# Patient Record
(demographics unavailable — no encounter records)

---

## 2025-01-09 NOTE — HEALTH RISK ASSESSMENT
[Fair] : ~his/her~ current health as fair  [Good] : ~his/her~  mood as  good [Intercurrent Urgi Care visits] : went to urgent care [Yes] : Yes [Monthly or less (1 pt)] : Monthly or less (1 point) [1 or 2 (0 pts)] : 1 or 2 (0 points) [Never (0 pts)] : Never (0 points) [No] : In the past 12 months have you used drugs other than those required for medical reasons? No [No falls in past year] : Patient reported no falls in the past year [0] : 2) Feeling down, depressed, or hopeless: Not at all (0) [Patient reported mammogram was normal] : Patient reported mammogram was normal [Patient reported PAP Smear was normal] : Patient reported PAP Smear was normal [HIV test declined] : HIV test declined [Hepatitis C test declined] : Hepatitis C test declined [# of Members in Household ___] :  household currently consist of [unfilled] member(s) [Retired] : retired [] :  [Smoke Detector] : smoke detector [Carbon Monoxide Detector] : carbon monoxide detector [Seat Belt] :  uses seat belt [Sunscreen] : uses sunscreen [de-identified] : [place] MD to check right ear  [de-identified] : cardiologist Dr. Bashir  [Audit-CScore] : 1 [de-identified] : 30 Minutes of aerobic exercise Five times  aweek  [de-identified] : Healthy [Reports changes in hearing] : Reports no changes in hearing [Reports changes in vision] : Reports no changes in vision [Reports changes in dental health] : Reports no changes in dental health [MammogramComments] : Patient doesn't recall last mammogram  [PapSmearComments] : Patient doesn't recall exact date  [BoneDensityComments] : Patient never done it before [ColonoscopyComments] : Patient never done it before [de-identified] :   [de-identified] : No

## 2025-01-09 NOTE — PHYSICAL EXAM
[No Acute Distress] : no acute distress [No JVD] : no jugular venous distention [No Respiratory Distress] : no respiratory distress  [Normal Rate] : normal rate  [No Edema] : there was no peripheral edema [Soft] : abdomen soft [Non Tender] : non-tender [Normal Supraclavicular Nodes] : no supraclavicular lymphadenopathy [Declined Breast Exam] : declined breast exam  [No CVA Tenderness] : no CVA  tenderness [No Joint Swelling] : no joint swelling [No Rash] : no rash [Normal Gait] : normal gait

## 2025-01-09 NOTE — REVIEW OF SYSTEMS
[Fever] : no fever [Earache] : no earache [Chest Pain] : no chest pain [Shortness Of Breath] : no shortness of breath [Abdominal Pain] : no abdominal pain [Dysuria] : no dysuria [Headache] : no headache [Insomnia] : no insomnia

## 2025-01-09 NOTE — PLAN
[FreeTextEntry1] : needs colonoscopy need for routine screening was discussed including mammogram GI referral given

## 2025-01-09 NOTE — REVIEW OF SYSTEMS
[Earache] : no earache [Fever] : no fever [Chest Pain] : no chest pain [Shortness Of Breath] : no shortness of breath [Abdominal Pain] : no abdominal pain [Dysuria] : no dysuria [Headache] : no headache [Insomnia] : no insomnia

## 2025-01-09 NOTE — HEALTH RISK ASSESSMENT
[Fair] : ~his/her~ current health as fair  [Good] : ~his/her~  mood as  good [Intercurrent Urgi Care visits] : went to urgent care [Yes] : Yes [Monthly or less (1 pt)] : Monthly or less (1 point) [1 or 2 (0 pts)] : 1 or 2 (0 points) [Never (0 pts)] : Never (0 points) [No] : In the past 12 months have you used drugs other than those required for medical reasons? No [No falls in past year] : Patient reported no falls in the past year [0] : 2) Feeling down, depressed, or hopeless: Not at all (0) [Patient reported mammogram was normal] : Patient reported mammogram was normal [Patient reported PAP Smear was normal] : Patient reported PAP Smear was normal [HIV test declined] : HIV test declined [Hepatitis C test declined] : Hepatitis C test declined [# of Members in Household ___] :  household currently consist of [unfilled] member(s) [Retired] : retired [] :  [Smoke Detector] : smoke detector [Carbon Monoxide Detector] : carbon monoxide detector [Seat Belt] :  uses seat belt [Sunscreen] : uses sunscreen [de-identified] : [place] MD to check right ear  [de-identified] : cardiologist Dr. Bashir  [Audit-CScore] : 1 [de-identified] : 30 Minutes of aerobic exercise Five times  aweek  [de-identified] : Healthy [Reports changes in hearing] : Reports no changes in hearing [Reports changes in vision] : Reports no changes in vision [Reports changes in dental health] : Reports no changes in dental health [PapSmearComments] : Patient doesn't recall exact date  [MammogramComments] : Patient doesn't recall last mammogram  [BoneDensityComments] : Patient never done it before [ColonoscopyComments] : Patient never done it before [de-identified] :   [de-identified] : No

## 2025-01-09 NOTE — HISTORY OF PRESENT ILLNESS
[FreeTextEntry1] : took abx  for ear infection 3 weeks ago given at Urgent Care  2 days after starting abx developed diarrhea with occasional blood streaking [de-identified] :  subsequently saw ENT  who told patient to stop abx because there was no ear infection currently having 3 loose BM /day no gross blood in bowl but does see small amount on paper no abd pain feels otherwise well never had colonoscopy no meds other than metoprolol

## 2025-01-09 NOTE — ASSESSMENT
[FreeTextEntry1] : no current distress never had colonoscopy  had labs done by cardiology [Dr Bashir] and cbc was normal- no leukocytosis and normal Hb

## 2025-01-09 NOTE — HISTORY OF PRESENT ILLNESS
[FreeTextEntry1] : took abx  for ear infection 3 weeks ago given at Urgent Care  2 days after starting abx developed diarrhea with occasional blood streaking [de-identified] :  subsequently saw ENT  who told patient to stop abx because there was no ear infection currently having 3 loose BM /day no gross blood in bowl but does see small amount on paper no abd pain feels otherwise well never had colonoscopy no meds other than metoprolol

## 2025-01-10 NOTE — HISTORY OF PRESENT ILLNESS
[FreeTextEntry1] : pt with PAF , PPM FOR PAUSES, MOD MR/TR, TR parvin: 2.9 m/s, mild DUSTIN , mod MR, HYPERKALEMIA , pulmonary htn, DD2  CUFF ACCURATE WITH OFFICE.   : stress nuclear: negative for ischemia, DTS 9  ep: DR. JOHNSON   23: pt is exercising 4 to 5 times/week 30 minutes of low impact aerobics, denies cp or sob, pt off of aspirin, last episode of afib was in .  had PPM checked using pacer 60% time per patient and no episodes of afib witinessed.  pt runs after grandkids  23: K: 5.6, SPS X 5 DAYS, REPEAT BW   23: 23:EF: 69%, DD1, mild LAE, mild DOM, mod MR, TR parvin: 2.8, IVC: 2.7  ekg: nsr normal t waves  pt did not take kalexate, pt changed diet and did not repeat bloodwork, f/u ppm and EOL in 1 year. pt says did not have afib on PPM or NSVT.  pt denies cp or sob.   24: hyperkalemic in past. and still 5.4 now. gfr: 90nt bnp: 318  HDL 54  ; HDL 67 LDL 84 GFR: 70 BNP: 42 TSH: 2.08  24: HDL: 54, T, LDL: 107, K: 5.4, BNP: 318, Pt says not drinking water and is doing low impact aerobics and not out of breath.  bnp elevated but elevated bnp.   24: 7/3/24: BNP: 172, HDL: 63, T, LDL: 91 potassium 4.2  24: 50-55%, DD2, TR parvin: 2.6 m/s, e' sept: 0.08 m/s, E/e': 8, L wave on mv inflow but less than 0.3 m/s, lvot vti: 17.8 cm. GLS: -17.3% posterolateral decreased strain. mild LAE, mod MR, PASP is 41 mmHg, consistent with pulmonary hypertension. dilated IVC 2.2 cm no Respirophasic changes.  PT HAD PPM BATTERY CHANGE IN JANUARY. PT  denies l ext edema, pt denies cp or sob. no afib on PPM. pt denies cp or sob and does 30 minutes daily of low impact aerobics.   1/10/25: 1/3/25: HDL: 58, T, LDL: 97, BNP: 135 24: 179BPM NSVT on EP report: Get Echo and CCTA.  12/10/24: EF: 64%, DD2, TR parvin: 3 m/s, GLS: -21.0%, E/a: 1.13, E/e': 8.4, lvot vti: 23.5 cm. mild LAE, mild MR, PASP: 52, mild AR TR parvin increased to 3 m/s.  pt had GI issues with diarrhea for a few weeks and cancelled CCTA. pt has rescheduled CCTA to 25  pt says she feels well.pt deneis cp, pt is able to do low impact aerobics.

## 2025-01-10 NOTE — HISTORY OF PRESENT ILLNESS
[FreeTextEntry1] : pt with PAF , PPM FOR PAUSES, MOD MR/TR, TR parvin: 2.9 m/s, mild DUSTIN , mod MR, HYPERKALEMIA , pulmonary htn, DD2  CUFF ACCURATE WITH OFFICE.   : stress nuclear: negative for ischemia, DTS 9  ep: DR. JOHNSON   23: pt is exercising 4 to 5 times/week 30 minutes of low impact aerobics, denies cp or sob, pt off of aspirin, last episode of afib was in .  had PPM checked using pacer 60% time per patient and no episodes of afib witinessed.  pt runs after grandkids  23: K: 5.6, SPS X 5 DAYS, REPEAT BW   23: 23:EF: 69%, DD1, mild LAE, mild DOM, mod MR, TR parvin: 2.8, IVC: 2.7  ekg: nsr normal t waves  pt did not take kalexate, pt changed diet and did not repeat bloodwork, f/u ppm and EOL in 1 year. pt says did not have afib on PPM or NSVT.  pt denies cp or sob.   24: hyperkalemic in past. and still 5.4 now. gfr: 90nt bnp: 318  HDL 54  ; HDL 67 LDL 84 GFR: 70 BNP: 42 TSH: 2.08  24: HDL: 54, T, LDL: 107, K: 5.4, BNP: 318, Pt says not drinking water and is doing low impact aerobics and not out of breath.  bnp elevated but elevated bnp.   24: 7/3/24: BNP: 172, HDL: 63, T, LDL: 91 potassium 4.2  24: 50-55%, DD2, TR parvni: 2.6 m/s, e' sept: 0.08 m/s, E/e': 8, L wave on mv inflow but less than 0.3 m/s, lvot vti: 17.8 cm. GLS: -17.3% posterolateral decreased strain. mild LAE, mod MR, PASP is 41 mmHg, consistent with pulmonary hypertension. dilated IVC 2.2 cm no Respirophasic changes.  PT HAD PPM BATTERY CHANGE IN JANUARY. PT  denies l ext edema, pt denies cp or sob. no afib on PPM. pt denies cp or sob and does 30 minutes daily of low impact aerobics.   1/10/25: 1/3/25: HDL: 58, T, LDL: 97, BNP: 135 24: 179BPM NSVT on EP report: Get Echo and CCTA.  12/10/24: EF: 64%, DD2, TR parvin: 3 m/s, GLS: -21.0%, E/a: 1.13, E/e': 8.4, lvot vti: 23.5 cm. mild LAE, mild MR, PASP: 52, mild AR TR parvin increased to 3 m/s.  pt had GI issues with diarrhea for a few weeks and cancelled CCTA. pt has rescheduled CCTA to 25  pt says she feels well.pt deneis cp, pt is able to do low impact aerobics.

## 2025-01-10 NOTE — PHYSICAL EXAM
[Normal Venous Pressure] : normal venous pressure [Normal S1, S2] : normal S1, S2 [Clear Lung Fields] : clear lung fields [Soft] : abdomen soft [No Edema] : no edema [de-identified] : jerry  [de-identified] : rrr

## 2025-01-10 NOTE — DISCUSSION/SUMMARY
[FreeTextEntry1] : compensated hfpef  DUSTIN no afib seen per patient on ep report watch high potassium foods,  increase hydration  pulmonary htn increasing, pt with DD2  will start jardiance/farxiga.  NSVT: await CCTA  continue to exercise  f/u ppm with dr wang.  f/u in 6 months bloodwork 2d echo

## 2025-01-10 NOTE — PHYSICAL EXAM
[Normal Venous Pressure] : normal venous pressure [Normal S1, S2] : normal S1, S2 [Clear Lung Fields] : clear lung fields [Soft] : abdomen soft [No Edema] : no edema [de-identified] : jerry  [de-identified] : rrr

## 2025-07-11 NOTE — PHYSICAL EXAM
[Normal Venous Pressure] : normal venous pressure [Normal S1, S2] : normal S1, S2 [Clear Lung Fields] : clear lung fields [Soft] : abdomen soft [No Edema] : no edema [de-identified] : jerry  [de-identified] : rrr

## 2025-07-11 NOTE — HISTORY OF PRESENT ILLNESS
[FreeTextEntry1] : pt with : CCTA: pLAD 50%, CAC 93 PAF , PPM FOR PAUSES, MOD MR/TR, TR parvin: 2.9 m/s, mild DUSTIN , mod MR, HYPERKALEMIA , pulmonary htn, DD2  CUFF ACCURATE WITH OFFICE.   : stress nuclear: negative for ischemia, DTS 9  ep: DR. JOHNSON   23: pt is exercising 4 to 5 times/week 30 minutes of low impact aerobics, denies cp or sob, pt off of aspirin, last episode of afib was in .  had PPM checked using pacer 60% time per patient and no episodes of afib witinessed.  pt runs after grandkids  23: K: 5.6, SPS X 5 DAYS, REPEAT BW   23: 23:EF: 69%, DD1, mild LAE, mild DOM, mod MR, TR parvin: 2.8, IVC: 2.7  ekg: nsr normal t waves  pt did not take kalexate, pt changed diet and did not repeat bloodwork, f/u ppm and EOL in 1 year. pt says did not have afib on PPM or NSVT.  pt denies cp or sob.   24: hyperkalemic in past. and still 5.4 now. gfr: 90nt bnp: 318  HDL 54  ; HDL 67 LDL 84 GFR: 70 BNP: 42 TSH: 2.08  24: HDL: 54, T, LDL: 107, K: 5.4, BNP: 318, Pt says not drinking water and is doing low impact aerobics and not out of breath.  bnp elevated but elevated bnp.   24: 7/3/24: BNP: 172, HDL: 63, T, LDL: 91 potassium 4.2  24: 50-55%, DD2, TR parvin: 2.6 m/s, e' sept: 0.08 m/s, E/e': 8, L wave on mv inflow but less than 0.3 m/s, lvot vti: 17.8 cm. GLS: -17.3% posterolateral decreased strain. mild LAE, mod MR, PASP is 41 mmHg, consistent with pulmonary hypertension. dilated IVC 2.2 cm no Respirophasic changes.  PT HAD PPM BATTERY CHANGE IN JANUARY. PT  denies l ext edema, pt denies cp or sob. no afib on PPM. pt denies cp or sob and does 30 minutes daily of low impact aerobics.   1/10/25: 1/3/25: HDL: 58, T, LDL: 97, BNP: 135 24: 179BPM NSVT on EP report: Get Echo and CCTA.  12/10/24: EF: 64%, DD2, TR parvin: 3 m/s, GLS: -21.0%, E/a: 1.13, E/e': 8.4, lvot vti: 23.5 cm. mild LAE, mild MR, PASP: 52, mild AR TR parvin increased to 3 m/s.  pt had GI issues with diarrhea for a few weeks and cancelled CCTA. pt has rescheduled CCTA to 25  pt says she feels well.pt denies cp, pt is able to do low impact aerobics.   25: Proximal LAD calcified plaque causing up to mild stenosis.  CAD-RADS 2. The total Agatston coronary artery calcium score equals 93. Patient given results by NP on 25. Rosuvastatin 20mg started.  25: No hemodynamically significant stenosis by FFR CT analysis. CAD-RADS 2/I-.  25: 7/3/25: HDL: 47, T, LDL: 28 from 97, BNP: 399 increasing  25: EF: 60%, LVMI: 81 g/m2, ivsd: 1.1 cm, GLS: -19.8%, E parvin: 0.55 m/s, E/a:: 1.04, E/e': 7, e' sept: 0.07 m/s,. DD2, mild LAE, Mod MR, borderline LVH, PASP: 42, Mod TR  pt says saw isber and PPM normal functioning and no afib, pt exercising low impact aerobics and no issues doing it.  increasing bnp but asymptomatic.

## 2025-07-11 NOTE — PHYSICAL EXAM
[Normal Venous Pressure] : normal venous pressure [Normal S1, S2] : normal S1, S2 [Clear Lung Fields] : clear lung fields [Soft] : abdomen soft [No Edema] : no edema [de-identified] : jerry  [de-identified] : rrr

## 2025-07-11 NOTE — DISCUSSION/SUMMARY
[FreeTextEntry1] : compensated hfpef  DUSTIN no afib seen per patient on ep report watch high potassium foods,  increase hydration  pulmonary htn increasing, pt with DD2  will start Jardiance NSVT: CCTA mild dz, GDMT  continue to exercise  f/u ppm with dr wang.  reduce rosuvastatin to 10 mg po qhs  continue metoprolol er 50 mg po qd  f/u in 6 months bloodwork carotid